# Patient Record
Sex: MALE | Race: WHITE | NOT HISPANIC OR LATINO | Employment: FULL TIME | URBAN - METROPOLITAN AREA
[De-identification: names, ages, dates, MRNs, and addresses within clinical notes are randomized per-mention and may not be internally consistent; named-entity substitution may affect disease eponyms.]

---

## 2017-07-24 ENCOUNTER — PATIENT OUTREACH (OUTPATIENT)
Dept: ADMINISTRATIVE | Facility: HOSPITAL | Age: 62
End: 2017-07-24

## 2017-07-24 NOTE — PROGRESS NOTES
CALLED TO SCHEDULE PCP VISIT. HAS BEEN OVER A YEAR SINCE BEEN INTO CLINIC. LAILA STATES HE NO LONGER NEEDS TO SEE DR MONTEIRO.